# Patient Record
Sex: MALE | Race: WHITE
[De-identification: names, ages, dates, MRNs, and addresses within clinical notes are randomized per-mention and may not be internally consistent; named-entity substitution may affect disease eponyms.]

---

## 2019-06-24 ENCOUNTER — HOSPITAL ENCOUNTER (EMERGENCY)
Dept: HOSPITAL 97 - ER | Age: 49
Discharge: HOME | End: 2019-06-24
Payer: COMMERCIAL

## 2019-06-24 DIAGNOSIS — L03.011: Primary | ICD-10-CM

## 2019-06-24 DIAGNOSIS — F17.210: ICD-10-CM

## 2019-06-24 PROCEDURE — 0H9FXZZ DRAINAGE OF RIGHT HAND SKIN, EXTERNAL APPROACH: ICD-10-PCS

## 2019-06-24 PROCEDURE — 99284 EMERGENCY DEPT VISIT MOD MDM: CPT

## 2019-06-24 NOTE — EDPHYS
Physician Documentation                                                                           

 [facility]                                                                 

Name: Tima Thomason                                                                               

Age: 48 yrs                                                                                       

Sex: Male                                                                                         

: 1970                                                                                   

MRN: [id number]                                                                                   

Arrival Date: 2019                                                                          

Time: 19:04                                                                                       

Account#: [id number]                                                                            

Bed 28                                                                                            

Private MD: Michelet Sharma ED Physician David Weir                                                                       

HPI:                                                                                              

                                                                                             

19:37 This 48 yrs old  Male presents to ER via Ambulatory with complaints of         kdr 

      Infected finger.                                                                            

19:37 The patient or guardian reports decreased range of motion, pain, swelling, tenderness.  kdr 

      The complaints affect the DIP of right middle finger. Context: The problem was              

      sustained at home, resulted from an unknown cause, The patient states that he has had       

      pain and swelling to the same area intermittently for more than a year. It has never        

      been this bad. The patient had had a prior injury to the finger which left a not on the     

      dorsum of the right middle finger. 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      

      enough that it has created a trough in the nail the length of the nail. Normally he         

      dose not have pain . Onset: The symptoms/episode began/occurred gradually, 4 day(s)         

      ago. Modifying factors: The symptoms are alleviated by elevation, the symptoms are          

      aggravated by movement, dependent position. Associated signs and symptoms: The patient      

      has no apparent associated signs or symptoms, Pertinent positives:. Severity of             

      symptoms: At their worst the symptoms were mild, moderate, just prior to arrival, in        

      the emergency department the symptoms are unchanged. The patient has experienced            

      similar episodes in the past, but today's symptoms are worse. The patient has not           

      recently seen a physician.                                                                  

                                                                                                  

Historical:                                                                                       

- Allergies:                                                                                      

19:06 No Known Allergies;                                                                     aa1 

- Home Meds:                                                                                      

19:06 None [Active];                                                                          aa1 

- PMHx:                                                                                           

19:06 None;                                                                                   aa1 

- PSHx:                                                                                           

19:06 foot sx; lap band;                                                                      aa1 

                                                                                                  

- Immunization history:: Flu vaccine is not up to date.                                           

- Social history:: Smoking status: Patient uses tobacco products, smokes one-half pack            

  cigarettes per day.                                                                             

- Ebola Screening: : No symptoms or risks identified at this time.                                

                                                                                                  

                                                                                                  

ROS:                                                                                              

19:37 Constitutional: Negative for fever, chills, and weight loss.                            kdr 

19:37 MS/extremity: Positive for decreased range of motion, erythema, pain, swelling,             

      tenderness, warmth, of the right middle finger and dorsal aspect of distal phalanx of       

      right middle finger.                                                                        

                                                                                                  

Exam:                                                                                             

19:37 Constitutional:  This is a well developed, well nourished patient who is awake, alert,  kdr 

      and in no acute distress.                                                                   

19:37 Musculoskeletal/extremity: Extremities: grossly normal except: noted in the right           

      middle finger and dorsal aspect of distal phalanx of right middle finger: decreased         

      ROM, erythema, pain, swelling, tenderness.                                                  

                                                                                                  

Vital Signs:                                                                                      

19:06  / 83; Pulse 90; Resp 18; Temp 98.2; Pulse Ox 100% on R/A; Weight 95.71 kg (R);   aa1 

      Height 5 ft. 10 in. (177.80 cm); Pain 8/10;                                                 

20:32  / 96; Pulse 69; Resp 18; Temp 98; Pulse Ox 100% on R/A; Pain 2/10;               mg2 

21:52  / 78; Pulse 70; Resp 18; Temp 98; Pulse Ox 100% on R/A; Pain 2/10;               mg2 

19:06 Body Mass Index 30.28 (95.71 kg, 177.80 cm)                                             aa1 

                                                                                                  

Procedures:                                                                                       

21:32 I \T\ D: Incision and drainage was performed for an abscess of the right dorsal aspect of kdr

      distal phalanx of right middle finger and right arm Prepped with alcohol, Anesthetized      

      with 4 ml's 1% Lidocaine. Marcaine. Incised with scissors . Drained small amount            

      purulent fluid. bloody fluid. Loculations removed. Dressing: sterile 4x4 gauze,             

      non-Adherent dressing, the patient tolerated the procedure well.                            

                                                                                                  

MDM:                                                                                              

21:29 Patient medically screened.                                                             kdr 

21:32 Data reviewed: vital signs, nurses notes, radiologic studies. Counseling: I had a       kdr 

      detailed discussion with the patient and/or guardian regarding: the historical points,      

      exam findings, and any diagnostic results supporting the discharge/admit diagnosis,         

      radiology results, the need for outpatient follow up.                                       

                                                                                                  

                                                                                             

19:36 Order name: Hand Right 3 View XRAY                                                      [facility] 

                                                                                             

19:36 Order name: Dressing - Wound; Complete Time: 19:54                                      kdr 

                                                                                             

19:36 Order name: Gloves, Sterile; Complete Time: 19:54                                       kdr 

                                                                                             

19:36 Order name: Setup Suture Tray; Complete Time: 19:54                                     kdr 

                                                                                                  

Administered Medications:                                                                         

20:18 Drug: KeFLEX 500 mg Route: PO;                                                          mg2 

21:53 Follow up: Response: No adverse reaction; Medication administered at discharge.         mg2 

                                                                                                  

                                                                                                  

Disposition:                                                                                      

19 21:29 Discharged to Home. Impression: Cellulitis of right finger.                        

- Condition is Stable.                                                                            

- Discharge Instructions: Cellulitis, Adult, Paronychia, Easy-to-Read.                            

- Prescriptions for Keflex 500 mg Oral Capsule - take 1 capsule by ORAL route every 6             

  hours for 10 days; 40 capsule. Tramadol 50 mg Oral Tablet - take 1 tablet by ORAL               

  route every 8 hours as needed; 12 tablet.                                                       

- Medication Reconciliation Form, Thank You Letter, Antibiotic Education, Prescription            

  Opioid Use form.                                                                                

- Follow up: Michelet Sharma MD; When: 1 - 2 days; Reason: If symptoms return, Further           

  diagnostic work-up, Recheck today's complaints, Continuance of care, Re-evaluation by           

  your physician. Follow up: Frandy Howell MD; When: 1 - 2 days; Reason: If symptoms           

  return, Further diagnostic work-up, Recheck today's complaints, Continuance of care,            

  Re-evaluation by your physician.                                                                

- Problem is new.                                                                                 

- Symptoms have improved.                                                                         

                                                                                                  

                                                                                                  

                                                                                                  

Signatures:                                                                                       

Dispatcher [employer]                           EDMS                                                 

Silvia Saul RN                  RN   aa1                                                  

David Weir MD                      MD   kdr                                                  

Garfield Munoz RN                    RN   mg2                                                  

                                                                                                  

Corrections: (The following items were deleted from the chart)                                    

21:54 21:29 2019 21:29 Discharged to Home. Impression: Cellulitis of right finger.      mg2 

      Condition is Stable. Forms are Medication Reconciliation Form, Thank You Letter,            

      Antibiotic Education, Prescription Opioid Use. Follow up: Michelet Sharma; When: 1 - 2       

      days; Reason: If symptoms return, Further diagnostic work-up, Recheck today's               

      complaints, Continuance of care, Re-evaluation by your physician. Follow up: Frandy Howell; When: 1 - 2 days; Reason: If symptoms return, Further diagnostic work-up,        

      Recheck today's complaints, Continuance of care, Re-evaluation by your physician.           

      Problem is new. Symptoms have improved. kdr                                                 

                                                                                                  

**************************************************************************************************

## 2019-06-24 NOTE — ER
Nurse's Notes                                                                                     

 [facility]                                                                 

Name: Tima Thomason                                                                               

Age: 48 yrs                                                                                       

Sex: Male                                                                                         

: 1970                                                                                   

MRN: [id number]                                                                                   

Arrival Date: 2019                                                                          

Time: 19:04                                                                                       

Account#: [id number]                                                                            

Bed 28                                                                                            

Private MD: Michelet Sharma                                                                       

Diagnosis: Cellulitis of right finger                                                             

                                                                                                  

Presentation:                                                                                     

                                                                                             

19:05 Presenting complaint: Patient states: swelling and redness to R middle finger x 4 days. aa1 

      Denies injury. Transition of care: patient was not received from another setting of         

      care. Onset of symptoms was 2019. Risk Assessment: Do you want to hurt             

      yourself or someone else? Patient reports no desire to harm self or others. Initial         

      Sepsis Screen: Does the patient meet any 2 criteria? No. Patient's initial sepsis           

      screen is negative. Does the patient have a suspected source of infection? No.              

      Patient's initial sepsis screen is negative. Care prior to arrival: None.                   

19:05 Method Of Arrival: Ambulatory                                                           aa1 

19:05 Acuity: SANA 4                                                                           aa1 

                                                                                                  

Triage Assessment:                                                                                

19:06 General: Appears in no apparent distress. comfortable, Behavior is calm, cooperative,   aa1 

      appropriate for age.                                                                        

                                                                                                  

Historical:                                                                                       

- Allergies:                                                                                      

19:06 No Known Allergies;                                                                     aa1 

- Home Meds:                                                                                      

19:06 None [Active];                                                                          aa1 

- PMHx:                                                                                           

19:06 None;                                                                                   aa1 

- PSHx:                                                                                           

19:06 foot sx; lap band;                                                                      aa1 

                                                                                                  

- Immunization history:: Flu vaccine is not up to date.                                           

- Social history:: Smoking status: Patient uses tobacco products, smokes one-half pack            

  cigarettes per day.                                                                             

- Ebola Screening: : No symptoms or risks identified at this time.                                

                                                                                                  

                                                                                                  

Screenin:32 Abuse screen: Denies threats or abuse. Denies injuries from another. Nutritional        mg2 

      screening: No deficits noted. Tuberculosis screening: No symptoms or risk factors           

      identified. Fall Risk None identified.                                                      

                                                                                                  

Assessment:                                                                                       

20:33 General: Appears in no apparent distress. comfortable, Behavior is calm, cooperative.   mg2 

      Pain: Complains of pain in right middle finger Pain currently is 2 out of 10 on a pain      

      scale. Quality of pain is described as aching, Pain began gradually, 2-3 days ago.          

      Neuro: Level of Consciousness is awake, alert, obeys commands, Oriented to person,          

      place, time, situation. Cardiovascular: Capillary refill < 3 seconds Patient's skin is      

      warm and dry. Respiratory: Airway is patent Respiratory effort is even, unlabored,          

      Respiratory pattern is regular, symmetrical. GI: No deficits noted. : No signs and/or     

      symptoms were reported regarding the genitourinary system. EENT: No deficits noted.         

      Derm: Skin is intact, is healthy with good turgor, Skin is pink, warm \T\ dry. normal.      

      Derm: Abscess located on right middle finger is nickel sized. Musculoskeletal:              

      Circulation, motion, and sensation intact. Capillary refill < 3 seconds.                    

                                                                                                  

Vital Signs:                                                                                      

19:06  / 83; Pulse 90; Resp 18; Temp 98.2; Pulse Ox 100% on R/A; Weight 95.71 kg (R);   aa1 

      Height 5 ft. 10 in. (177.80 cm); Pain 8/10;                                                 

20:32  / 96; Pulse 69; Resp 18; Temp 98; Pulse Ox 100% on R/A; Pain 2/10;               mg2 

21:52  / 78; Pulse 70; Resp 18; Temp 98; Pulse Ox 100% on R/A; Pain 2/10;               mg2 

19:06 Body Mass Index 30.28 (95.71 kg, 177.80 cm)                                             aa1 

                                                                                                  

ED Course:                                                                                        

19:04 Patient arrived in ED.                                                                  es  

19:04 Michelet Sharma MD is Private Physician.                                               es  

19:06 Triage completed.                                                                       aa1 

19:06 Arm band placed on right wrist. Patient placed in waiting room, Patient notified of     aa1 

      wait time.                                                                                  

19:26 Garfield Munoz RN is Primary Nurse.                                                  mg2 

19:27 David Weir MD is Attending Physician.                                              kdr 

20:32 Assist provider with I \T\ D: of an abscess on right middle finger Set up I\T\D tray.       mg
2

      Performed by David Weir MD Wound packed. 4X4s, Dressing with 4X4s. Patient did not       

      have IV access during this emergency room visit.                                            

20:36 Patient has correct armband on for positive identification.                             mg2 

20:41 Hand Right 3 View XRAY In Process Unspecified.                                          EDMS

21:28 Michelet Sharma MD is Referral Physician.                                              kdr 

21:28 Frandy Howell MD is Referral Physician.                                              kdr 

                                                                                                  

Administered Medications:                                                                         

20:18 Drug: KeFLEX 500 mg Route: PO;                                                          mg2 

21:53 Follow up: Response: No adverse reaction; Medication administered at discharge.         mg2 

                                                                                                  

                                                                                                  

Outcome:                                                                                          

21:29 Discharge ordered by MD.                                                                kdr 

21:53 Discharged to home ambulatory.                                                          mg2 

21:53 Condition: stable                                                                           

21:53 Discharge instructions given to patient, Instructed on discharge instructions, follow       

      up and referral plans. medication usage, Demonstrated understanding of instructions,        

      follow-up care, medications, wound care, Prescriptions given X 2.                           

21:54 Patient left the ED.                                                                    mg2 

                                                                                                  

Signatures:                                                                                       

Dispatcher [employer]                           EDSilvia Boateng, RN                  RN   aa1                                                  

David Weir MD MD kdr Salyer, Edna es Gardose, Michele, RN                    RN   mg2                                                  

                                                                                                  

**************************************************************************************************

## 2019-06-25 NOTE — RAD REPORT
EXAM DESCRIPTION:  RAD - Hand Right 3 View - 6/24/2019 8:42 pm

 

CLINICAL HISTORY:  Right third digit pain and soft tissue swelling, no known precipitating event

 

COMPARISON:  None.

 

FINDINGS:  No fracture is identified. There is no dislocation or periosteal reaction noted.  No acute
 or destructive bone or joint finding seen. Third digit soft tissues are thickened or edematous. No a
ir or foreign body in the soft tissues. Remodeling of the distal fifth metacarpal old trauma

 

IMPRESSION:  Right third digit soft tissue swelling with no acute bone or joint finding.

 

No air or foreign body in the soft tissue.